# Patient Record
Sex: FEMALE | Race: WHITE | Employment: UNEMPLOYED | URBAN - METROPOLITAN AREA
[De-identification: names, ages, dates, MRNs, and addresses within clinical notes are randomized per-mention and may not be internally consistent; named-entity substitution may affect disease eponyms.]

---

## 2017-01-05 ENCOUNTER — TELEPHONE (OUTPATIENT)
Dept: ORTHOPEDIC SURGERY | Age: 32
End: 2017-01-05

## 2017-01-05 ENCOUNTER — NURSE ONLY (OUTPATIENT)
Dept: ORTHOPEDIC SURGERY | Age: 32
End: 2017-01-05

## 2017-01-05 VITALS — HEIGHT: 67 IN | BODY MASS INDEX: 22.76 KG/M2 | WEIGHT: 145 LBS

## 2017-01-05 DIAGNOSIS — M17.0 PRIMARY OSTEOARTHRITIS OF BOTH KNEES: Primary | ICD-10-CM

## 2017-01-05 PROCEDURE — 20610 DRAIN/INJ JOINT/BURSA W/O US: CPT | Performed by: PHYSICIAN ASSISTANT

## 2017-01-19 ENCOUNTER — OFFICE VISIT (OUTPATIENT)
Dept: ORTHOPEDIC SURGERY | Age: 32
End: 2017-01-19

## 2017-01-19 VITALS — WEIGHT: 145 LBS | BODY MASS INDEX: 22.76 KG/M2 | HEIGHT: 67 IN

## 2017-01-19 DIAGNOSIS — M17.12 PRIMARY OSTEOARTHRITIS OF LEFT KNEE: Primary | ICD-10-CM

## 2017-01-19 PROCEDURE — 20610 DRAIN/INJ JOINT/BURSA W/O US: CPT | Performed by: PHYSICIAN ASSISTANT

## 2017-11-13 ENCOUNTER — OFFICE VISIT (OUTPATIENT)
Dept: ORTHOPEDIC SURGERY | Age: 32
End: 2017-11-13

## 2017-11-13 VITALS — HEIGHT: 67 IN | WEIGHT: 145.06 LBS | BODY MASS INDEX: 22.77 KG/M2

## 2017-11-13 DIAGNOSIS — M17.0 PRIMARY OSTEOARTHRITIS OF BOTH KNEES: Primary | ICD-10-CM

## 2017-11-13 PROCEDURE — 99213 OFFICE O/P EST LOW 20 MIN: CPT | Performed by: ORTHOPAEDIC SURGERY

## 2017-11-13 NOTE — PROGRESS NOTES
Chief Complaint:  Knee Pain (B/L   )      History of Present Illness:  Aura Quezada is a 28 y.o. female here regarding bilateral knee pain. She complains of primarily anterior knee pain. She was doing well the last year until just a month or 2 ago. Her symptoms began to return. She complains of significant crepitus also has difficulty sleeping at night because of knee pain. Her knee pain limits her activities. The patient is currently ambulating independently. The pain is located global. She describes the symptoms as aching, sharp and constant. Symptoms improve with rest. The symptoms are made worse with activity, stair climbing, kneeling, deep knee bending, getting up from a chair, weight bearing. Medical History:  Patient's medications, allergies, past medical, surgical, social and family histories were reviewed and updated as appropriate. Review of Systems:  Pertinent items are noted in HPI  Review of systems reviewed from Patient History Form dated on 11/14/17 and available in the patient's chart under the Media tab. Vital Signs: There were no vitals filed for this visit. Pain Assessment:  Pain Assessment  Location of Pain: Knee  Location Modifiers: Right  Severity of Pain: 4  Quality of Pain: Dull, Aching, Throbbing  Frequency of Pain: Constant  Limiting Behavior: Yes  Result of Injury: No  Work-Related Injury: No  Are there other pain locations you wish to document?: No         General Exam:   Constitutional: Patient is adequately groomed with no evidence of malnutrition  Mental Status: The patient is oriented to time, place and person. The patient's mood and affect are appropriate. Vascular: Examination reveals no swelling or calf tenderness. Peripheral pulses are palpable and 2+. Neurological: The patient has good coordination. There is no weakness or sensory deficit. Bilateral Knee Examination:  Today we examined both knees, are very similar and findings.   Inspection:   No gross deformities noted. mild swelling noted. No erythema or ecchymosis. Skin is intact. Palpation:  She is grade 1 effusion in both knees. She also has crepitus with flexion and extension is slightly worse than the left knee than the right especially in the patellofemoral joint. Range of Motion:  She reaches full extension bilaterally and flexes to about 130 bilaterally. Strength:  She seems to have appropriate strength bilaterally. There is no evidence of a focal motor deficit. Special Tests:  Ligamentous examination her knees are stable to varus and valgus stress. Lachman's posterior drawer pivot shift are all negative. Devin's testing is negative bilaterally    Skin: There are no rashes, ulcerations or lesions. Gait: she walks with a slightly antalgic gait. Reflex: Normal    Ipsilateral Hip Exam:  Shows normal range of motion without pain. Distal neurologic and circulatory examination of the ipsilateral lower extremity appears intact without deficit    Additional Examinations:  Contralateral Exam:Both knees were examined today. EUFLEXXA INJ PER DOSE      Assessment :  Bilateral knee degenerative osteoarthritis. Impression:  Encounter Diagnosis   Name Primary?  Primary osteoarthritis of both knees Yes       Office Procedures:  Orders Placed This Encounter   Procedures    EUFLEXXA INJ PER DOSE     Standing Status:   Future     Standing Expiration Date:   11/13/2018       Treatment Plan: Today we discussed options of treatment. She's been treated in the past with some exercise programs various bracing systems as well as oral medications. She did have some success in the past with previous injections with the Visco supplementation. She would like to apply for that once again. This dictation was performed with a verbal recognition program (DRAGON) and it was checked for errors. It is possible that there are still dictated errors within this office note.  If so, please bring any errors to my attention for an addendum. All efforts were made to ensure that this office note is accurate.

## 2017-11-14 ENCOUNTER — TELEPHONE (OUTPATIENT)
Dept: ORTHOPEDIC SURGERY | Age: 32
End: 2017-11-14